# Patient Record
Sex: MALE | Race: OTHER | Employment: FULL TIME | URBAN - METROPOLITAN AREA
[De-identification: names, ages, dates, MRNs, and addresses within clinical notes are randomized per-mention and may not be internally consistent; named-entity substitution may affect disease eponyms.]

---

## 2021-10-29 ENCOUNTER — HOSPITAL ENCOUNTER (EMERGENCY)
Age: 36
Discharge: HOME | End: 2021-10-29
Attending: EMERGENCY MEDICINE

## 2021-10-29 VITALS
OXYGEN SATURATION: 95 % | DIASTOLIC BLOOD PRESSURE: 78 MMHG | RESPIRATION RATE: 18 BRPM | TEMPERATURE: 98.9 F | WEIGHT: 168 LBS | SYSTOLIC BLOOD PRESSURE: 135 MMHG | HEART RATE: 76 BPM

## 2021-10-29 DIAGNOSIS — I83.893 VARICOSE VEINS OF BILATERAL LOWER EXTREMITIES WITH OTHER COMPLICATIONS: Primary | ICD-10-CM

## 2021-10-29 PROCEDURE — 99283 EMERGENCY DEPT VISIT LOW MDM: CPT

## 2021-10-29 NOTE — Clinical Note
[facility] EMERGENCY DEPT  [address] [facility] [id number]  [phone]    Work/School Note    Date: 10/29/2021    To Whom It May concern:      Aarti Garcia was seen and treated today in the emergency room by the following provider(s):  Attending Provider: John Calvillo MD.      Aarti Garcia is excused from work/school on 10/29/21. He is clear to return to work/school on 10/30/21.         Sincerely,          Beto Courtney MD

## 2021-10-30 NOTE — ED NOTES
I have reviewed discharge instructions with the patient. The patient verbalized understanding. Patient left ED via Discharge Method: ambulatory to Home . Opportunity for questions and clarification provided. Patient given 0 scripts. To continue your aftercare when you leave the hospital, you may receive an automated call from our care team to check in on how you are doing. This is a free service and part of our promise to provide the best care and service to meet your aftercare needs.  If you have questions, or wish to unsubscribe from this service please call [phone]. Thank you for Choosing our [facility].

## 2021-10-30 NOTE — ED PROVIDER NOTES
Patient is a 42-year-old  male who comes to the emergency department today with bleeding from a varicose vein on the lateral aspect of his right lower leg. States he has a long history of varicose veins. He does a lot of heavy work and on his feet all day at work. Tonight he noted some spontaneous bleeding. He is not aware of any injury. He applied pressure. Pressure dressing was placed in triage. Bleeding has since resolved. He is not on any medications or blood thinners. History is obtained with the assistance of the . The history is provided by the patient. The history is limited by a language barrier. A  was used. Bleeding/Bruising  This is a new problem. The current episode started 1 to 2 hours ago. The problem has been resolved. Pertinent negatives include no chest pain, no abdominal pain, no headaches and no shortness of breath. Treatments tried: Direct pressure. The treatment provided moderate relief. No past medical history on file. No past surgical history on file. No family history on file. Social History     Socioeconomic History    Marital status:      Spouse name: Not on file    Number of children: Not on file    Years of education: Not on file    Highest education level: Not on file   Occupational History    Not on file   Tobacco Use    Smoking status: Not on file   Substance and Sexual Activity    Alcohol use: Not on file    Drug use: Not on file    Sexual activity: Not on file   Other Topics Concern    Not on file   Social History Narrative    Not on file     Social Determinants of Health     Financial Resource Strain:     Difficulty of Paying Living Expenses:    Food Insecurity:     Worried About Running Out of Food in the Last Year:     [address] in the Last Year:    Transportation Needs:     Lack of Transportation (Medical):      Lack of Transportation (Non-Medical):    Physical Activity:  Days of Exercise per Week:     Minutes of Exercise per Session:    Stress:     Feeling of Stress :    Social Connections:     Frequency of Communication with Friends and Family:     Frequency of Social Gatherings with Friends and Family:     Attends Hoah[employer]ism Services:     Active Member of Clubs or Organizations:     Attends Club or Organization Meetings:     Marital Status:    Intimate Partner Violence:     Fear of Current or Ex-Partner:     Emotionally Abused:     Physically Abused:     Sexually Abused: ALLERGIES: Patient has no known allergies. Review of Systems   Constitutional: Negative for chills, fatigue and fever. HENT: Negative for congestion, rhinorrhea and sore throat. Eyes: Negative for pain, discharge and visual disturbance. Respiratory: Negative for cough and shortness of breath. Cardiovascular: Negative for chest pain and palpitations. Gastrointestinal: Negative for abdominal pain, diarrhea and nausea. Endocrine: Negative for polydipsia and polyuria. Genitourinary: Negative for dysuria, frequency and urgency. Musculoskeletal: Negative for back pain and neck pain. Skin: Negative for rash. Neurological: Negative for seizures, syncope, weakness and headaches. Hematological: Negative. Vitals:    10/29/21 2205   BP: (!) 166/97   Pulse: 76   Resp: 18   Temp: 98.9 °F (37.2 °C)   SpO2: 95%   Weight: 76.2 kg (168 lb)            Physical Exam  Vitals and nursing note reviewed. Constitutional:       Appearance: Normal appearance. Cardiovascular:      Rate and Rhythm: Normal rate. Pulses: Normal pulses. Musculoskeletal:      Comments: Minimal edema and chronic varicose veins of bilateral lower extremities. Skin:     General: Skin is warm and dry. Capillary Refill: Capillary refill takes less than 2 seconds. Comments: Multiple chronic appearing varicose veins. Some dried blood on the leg but no current active bleeding from the site. Neurological:      General: No focal deficit present. Mental Status: He is alert. Sensory: No sensory deficit. Motor: No weakness. MDM  Number of Diagnoses or Management Options  Diagnosis management comments: I wore appropriate PPE throughout this patient's ED visit. Regine Daugherty MD, 11:00 PM    Clinically bleeding has been stopped. Wound is clean. Dressing is applied and then an Ace wrap advised patient try ice and Ace wrap sore purchase some compression stockings to assist with the varicose veins. Voice dictation software was used during the making of this note. This software is not perfect and grammatical and other typographical errors may be present. This note has been proofread, but may still contain errors.   Regine Daugherty MD; 10/29/2021 @11:01 PM   ===================================================================      Risk of Complications, Morbidity, and/or Mortality  Presenting problems: low  Diagnostic procedures: minimal  Management options: minimal    Patient Progress  Patient progress: improved         Procedures
